# Patient Record
Sex: MALE | Race: OTHER
[De-identification: names, ages, dates, MRNs, and addresses within clinical notes are randomized per-mention and may not be internally consistent; named-entity substitution may affect disease eponyms.]

---

## 2020-03-03 NOTE — OP
DATE OF PROCEDURE:  03/02/2020



PREOPERATIVE DIAGNOSIS:  Bilateral inguinal hernia.



POSTOPERATIVE DIAGNOSIS:  Bilateral inguinal hernia.



PROCEDURE PERFORMED:  Da Yenifer bilateral inguinal hernia repair with mesh, Bard

3DMax large. 



ANESTHESIA:  General.



ESTIMATED BLOOD LOSS:  Minimal.



COMPLICATIONS:  None.



SPECIMEN:  None.



FINDINGS:  Bilateral inguinal hernia.



TECHNIQUE:  The patient was taken to the operating room and laid supine on the

operating room table.  After general anesthetic was obtained, the abdomen was

shaved, prepped, and draped in a sterile fashion.  A Aguila catheter had been placed.

 A curved incision was made above the umbilicus.  Cautery dissected down to and

score the fascia.  Abdominal cavity was entered bluntly using a Shiloh clamp.  An 11

mm balloon trocar was placed.  High-flow pneumoperitoneum was obtained.  Left and

right abdominal 8 mm robot trocars were placed.  All ports were docked to the robot.

 Surgeon goes to the console.  The peritoneum was taken down in the bilateral groin

and bilateral preperitoneal dissection was performed all the way to pubic tubercle

medially and to the anterior superior iliac crest laterally.  The shelving edge of

the ligament was fully exposed.  On the left, there was a direct hernia.  This

dissected back up high onto the peritoneum with a small indirect hernia.  On the

right side, there was a small indirect hernia.  No direct hernia.  The bilateral

3DMax large mesh was brought into the abdomen and the M-labeled medial aspects were

placed over pubic tubercles medially.  The mesh laid out to cover the indirect,

direct and femoral areas.  The mesh was sewn via 2-0 Vicryl to pubic tubercle

medially and to the posterior fascia laterally.  The peritoneum was reapproximated

using the running 3-0 Stratafix bilaterally.  All needles were removed from the

abdomen and accounted for.  All port sites were infiltrated using local anesthetic.

All ports were removed under camera visualization.   Prep was let down.  PDS used to

close the fascial defect above the umbilicus.  All incisions were irrigated and

closed using 4-0 Monocryl and Dermabond.  The patient was sent to Recovery in stable

condition.  All instrument counts, needle and counts lap counts were correct. 







Job ID:  [id number]

## 2021-12-14 ENCOUNTER — HOSPITAL ENCOUNTER (OUTPATIENT)
Dept: HOSPITAL 92 - SLEEPLAB | Age: 56
Discharge: HOME | End: 2021-12-14
Attending: FAMILY MEDICINE
Payer: COMMERCIAL

## 2021-12-14 DIAGNOSIS — R06.83: ICD-10-CM

## 2021-12-14 DIAGNOSIS — G47.33: Primary | ICD-10-CM

## 2021-12-14 DIAGNOSIS — E11.9: ICD-10-CM

## 2021-12-14 DIAGNOSIS — R53.83: ICD-10-CM

## 2021-12-14 PROCEDURE — 95806 SLEEP STUDY UNATT&RESP EFFT: CPT
